# Patient Record
Sex: FEMALE | Race: OTHER | HISPANIC OR LATINO | Employment: OTHER | URBAN - METROPOLITAN AREA
[De-identification: names, ages, dates, MRNs, and addresses within clinical notes are randomized per-mention and may not be internally consistent; named-entity substitution may affect disease eponyms.]

---

## 2025-04-30 ENCOUNTER — ANCILLARY PROCEDURE (OUTPATIENT)
Dept: URGENT CARE | Age: 70
End: 2025-04-30
Payer: MEDICARE

## 2025-04-30 ENCOUNTER — CLINICAL SUPPORT (OUTPATIENT)
Dept: URGENT CARE | Age: 70
End: 2025-04-30
Payer: MEDICARE

## 2025-04-30 VITALS
TEMPERATURE: 98 F | WEIGHT: 150 LBS | DIASTOLIC BLOOD PRESSURE: 70 MMHG | BODY MASS INDEX: 28.32 KG/M2 | SYSTOLIC BLOOD PRESSURE: 132 MMHG | OXYGEN SATURATION: 100 % | HEART RATE: 81 BPM | HEIGHT: 61 IN | RESPIRATION RATE: 19 BRPM

## 2025-04-30 DIAGNOSIS — S89.91XA INJURY OF RIGHT KNEE, INITIAL ENCOUNTER: Primary | ICD-10-CM

## 2025-04-30 DIAGNOSIS — S89.91XA INJURY OF RIGHT KNEE, INITIAL ENCOUNTER: ICD-10-CM

## 2025-04-30 PROCEDURE — 73562 X-RAY EXAM OF KNEE 3: CPT | Mod: RIGHT SIDE | Performed by: FAMILY MEDICINE

## 2025-04-30 PROCEDURE — 99203 OFFICE O/P NEW LOW 30 MIN: CPT | Performed by: FAMILY MEDICINE

## 2025-04-30 ASSESSMENT — ENCOUNTER SYMPTOMS
MUSCLE WEAKNESS: 1
LOSS OF MOTION: 1
DEPRESSION: 0
LOSS OF SENSATION: 0
INABILITY TO BEAR WEIGHT: 1
LOSS OF SENSATION IN FEET: 0
TINGLING: 0
OCCASIONAL FEELINGS OF UNSTEADINESS: 0

## 2025-04-30 ASSESSMENT — PATIENT HEALTH QUESTIONNAIRE - PHQ9
SUM OF ALL RESPONSES TO PHQ9 QUESTIONS 1 AND 2: 0
1. LITTLE INTEREST OR PLEASURE IN DOING THINGS: NOT AT ALL
2. FEELING DOWN, DEPRESSED OR HOPELESS: NOT AT ALL

## 2025-04-30 NOTE — PATIENT INSTRUCTIONS
You had x-rays taken. Your x-ray reading is an initial interpretation. It will be further reviewed by a radiologist. If further treatment is necessary, you will be notified by the urgent care.    Ice can be used for pain relief by placing it on the area pain for 15-20 minutes, 2-3 times per day.    You may use over-the-counter anti-inflammatory such as ibuprofen, Advil, Aleve for pain. For inflammation, ibuprofen doses include 400-600 mg 2-3 times per day. Tylenol is acceptable to use for pain.      Range of motion stretches and exercises    Recommending physical therapy

## 2025-04-30 NOTE — PROGRESS NOTES
"Subjective   Patient ID: Gisela Dumont is a 69 y.o. female. They present today with a chief complaint of Knee Injury (Doing a kick in karate right knee /Now she can't swivel motion on that knee pain is mostly on the inside of knee ).    Patient disposition: Home    History of Present Illness  HPI  Right knee injury after sparring in karate yesterday.  Patient was performing spinning backache, hit her opponent but in the process felt that her knee twisted.  Rested and iced it afterwards.  Today has been using a cane to help ambulate as this takes some of the pressure off.  Patient with history of tendinitis but no specific knee injury similar to this.  Has been icing it.  Pain localized to the medial joint, nonradiating.  No bruising or swelling.      Past Medical History  Allergies as of 04/30/2025 - Reviewed 04/30/2025   Allergen Reaction Noted    Lisinopril Unknown 02/02/2021    Nsaids (non-steroidal anti-inflammatory drug) Unknown and Other 09/14/2020       Prescriptions Prior to Admission[1]     Current Medications[2]    Problem List[3]    Surgical History[4]     reports that she has never smoked. She has never been exposed to tobacco smoke. She has never used smokeless tobacco.    Review of Systems  As noted in HPI. ROS otherwise negative unless noted.       Objective    Vitals:    04/30/25 1300   BP: 132/70   Pulse: 81   Resp: 19   Temp: 36.7 °C (98 °F)   SpO2: 100%   Weight: 68 kg (150 lb)   Height: 1.549 m (5' 1\")     No LMP recorded (lmp unknown). Patient is postmenopausal.    Physical Exam  Constitutional: vital signs reviewed. Well developed, well nourished. patient alert and patient without distress.   Psych: Normal mood and affect  Skin: Normal skin color and pigmentation, normal skin turgor, and no rash.  Lymphatic: No extremity edema  Cardiovascular: No edema in the extremities. Normal skin color/perfusion.   Pulmonary: Skin without cyanosis. Patient without respiratory distress. Speaking in " full sentences.  Musculoskeletal: Mildly antalgic gait.  Knee: Right  Alignment: midline  Edema: Mild medial  Tenderness: Medial joint,  Ballotable: No  Patellar shift/grind: negative  Anterior drawer: Negative  Pain with valgus strain but not lax.  Lachman: Negative  Poster drawer: Negative  MCL: intact  LCL: intact  Pivot shift: Negative  Thessaly test: Negative  Sensation: intact      Procedures    Point of Care Test & Imaging Results from this visit           Diagnostic study results (if any) were reviewed.  (If applicable) preliminary radiology reading: X-ray right knee, no acute deformity.  Calcification in bilateral menisci    Assessment/Plan   Allergies, medications, history, and pertinent labs/EKGs/Imaging reviewed.        Medical Decision Making  See note    Orders and Diagnoses  There are no diagnoses linked to this encounter.    Medical Admin Record      Follow Up Instructions  No follow-ups on file.    At time of discharge patient was clinically well-appearing and HDS for outpatient management. The patient and/or family was educated regarding diagnosis, supportive care, OTC and Rx medications. The patient and/or family was given the opportunity to ask questions prior to discharge and all questions answered. They verbalized understanding of my discussion of the plans for treatment, expected course, indications to return to  or seek further evaluation in ED, and the need for timely follow up as directed.      Electronically signed by [facility]           [1] (Not in a hospital admission)  [2]   No current outpatient medications on file.     No current facility-administered medications for this visit.   [3] There is no problem list on file for this patient.  [4] No past surgical history on file.